# Patient Record
Sex: FEMALE | Race: WHITE | Employment: FULL TIME | URBAN - METROPOLITAN AREA
[De-identification: names, ages, dates, MRNs, and addresses within clinical notes are randomized per-mention and may not be internally consistent; named-entity substitution may affect disease eponyms.]

---

## 2021-05-10 ENCOUNTER — HOSPITAL ENCOUNTER (EMERGENCY)
Age: 46
Discharge: HOME | End: 2021-05-10
Attending: EMERGENCY MEDICINE
Payer: COMMERCIAL

## 2021-05-10 ENCOUNTER — APPOINTMENT (OUTPATIENT)
Dept: GENERAL RADIOLOGY | Age: 46
End: 2021-05-10
Payer: COMMERCIAL

## 2021-05-10 VITALS
WEIGHT: 121.9 LBS | HEIGHT: 63 IN | HEART RATE: 70 BPM | OXYGEN SATURATION: 99 % | RESPIRATION RATE: 14 BRPM | TEMPERATURE: 97.8 F | SYSTOLIC BLOOD PRESSURE: 150 MMHG | BODY MASS INDEX: 21.6 KG/M2 | DIASTOLIC BLOOD PRESSURE: 87 MMHG

## 2021-05-10 DIAGNOSIS — M25.562 CHRONIC PAIN OF LEFT KNEE: ICD-10-CM

## 2021-05-10 DIAGNOSIS — G56.01 CARPAL TUNNEL SYNDROME OF RIGHT WRIST: Primary | ICD-10-CM

## 2021-05-10 DIAGNOSIS — G89.29 CHRONIC PAIN OF LEFT KNEE: ICD-10-CM

## 2021-05-10 DIAGNOSIS — R03.0 ELEVATED BLOOD PRESSURE READING: ICD-10-CM

## 2021-05-10 PROCEDURE — 73562 X-RAY EXAM OF KNEE 3: CPT

## 2021-05-10 PROCEDURE — 99283 EMERGENCY DEPT VISIT LOW MDM: CPT

## 2021-05-10 RX ORDER — METHYLPREDNISOLONE 4 MG/1
TABLET ORAL
Qty: 1 KIT | Refills: 0 | Status: SHIPPED | OUTPATIENT
Start: 2021-05-10

## 2021-05-10 RX ORDER — MELOXICAM 15 MG/1
15 TABLET ORAL DAILY PRN
Qty: 15 TABLET | Refills: 1 | Status: SHIPPED | OUTPATIENT
Start: 2021-05-10 | End: 2021-05-25

## 2021-05-10 ASSESSMENT — PAIN SCALES - GENERAL
PAINLEVEL_OUTOF10: 2
PAINLEVEL_OUTOF10: 2

## 2021-05-10 ASSESSMENT — PAIN DESCRIPTION - ONSET: ONSET: PROGRESSIVE

## 2021-05-10 ASSESSMENT — PAIN DESCRIPTION - DURATION: DURATION_2: INTERMITTENT

## 2021-05-10 ASSESSMENT — PAIN DESCRIPTION - PROGRESSION: CLINICAL_PROGRESSION_2: GRADUALLY WORSENING

## 2021-05-10 ASSESSMENT — PAIN DESCRIPTION - ORIENTATION: ORIENTATION: LEFT;POSTERIOR

## 2021-05-10 ASSESSMENT — PAIN DESCRIPTION - PAIN TYPE: TYPE: CHRONIC PAIN

## 2021-05-10 ASSESSMENT — PAIN DESCRIPTION - INTENSITY: RATING_2: 5

## 2021-05-10 ASSESSMENT — PAIN DESCRIPTION - DESCRIPTORS: DESCRIPTORS_2: ACHING

## 2021-05-10 NOTE — ED PROVIDER NOTES
TRIAGE CHIEF COMPLAINT:   Chief Complaint   Patient presents with    Knee Pain     left    Other     Numbness to fingers on right hand         HPI: Steffany Peraza is a 48 y.o. female who presents to the Emergency Department with complaint of left posterior knee pain that has been on and off for a few months. She denies any injury. She states it is worse with certain movements but she does not have the pain now. Denies calf pain or swelling. No hip ankle or foot pain. No numbness or weakness. She also states she has had some numbness and tingling in the fingers of her right hand for 2 to 3 weeks especially the middle finger and index finger. She states that is worse at night and gets better after she wakes up in the morning. Last night she slept with a wrist splint on and states the pain was better. No known injury. No sensory loss or weakness. Denies elbow, shoulder or neck pain. No headache. REVIEW OF SYSTEMS:  6 systems reviewed. Pertinent positives per HPI. Otherwise noted to be negative. Nursing notes reviewed and agree with above. Past medical/surgical history reviewed. MEDICATIONS   Patient's Medications   New Prescriptions    No medications on file   Previous Medications    LORATADINE-PSEUDOEPHEDRINE (LORATADINE-D 24HR PO)    Take by mouth    VITAMIN D PO    Take by mouth   Modified Medications    No medications on file   Discontinued Medications    No medications on file         ALLERGIES No Known Allergies      BP (!) 156/90   Pulse 72   Temp 97.8 °F (36.6 °C) (Oral)   Resp 16   Ht 5' 3\" (1.6 m)   Wt 121 lb 14.4 oz (55.3 kg)   SpO2 99%   BMI 21.59 kg/m²   General:  No acute distress. Non toxic appearance  Head:   Normocephalic and atraumatic  Eyes:   Conjunctiva clear, MAGALY, EOM's intact. Sclera anicteric. ENT:   Mucous membranes moist  Neck:   Supple. No adenopathy or jugular venous distension.   No tenderness  Lungs/Chest:  No respiratory distress  CVS:   Regular rate and rhythm  Abdomen:  Nontender  Extremities:  Full range of motion. Distal neurovascular exam is intact. Radial pulses are 3+ and equal.  Normal capillary refill in all fingers. Patient has positive Phalen's test.  Left knee is not swollen or tender. Range of motion is 0 to 130 degrees. No laxity or effusion. No joint space tenderness. No calf tenderness. Distal neurovascular exams intact. Skin:   No rashes or lesions to exposed skin  Back:   Nontender  Neuro:  Alert and OX3. Speech clear and appropriate. No upper/lower extremity weakness. Normal sensation in all extremities. No facial asymmetry or weakness. Gait normal.  Psych:   Affect normal. Mood normal        RADIOLOGY:  XR KNEE LEFT (3 VIEWS)   Final Result      No acute bony pathology          LAB      ED COURSE / MDM:  78-year-old female with 2 to 3-week history of tingling in the fingers of the right hand after she sleeps, especially the middle and index finger, improved last night after she wore a wrist splint with positive Phalen's test suggesting carpal tunnel syndrome. Distal neurovascular exams intact. Both hands are warm with 3+ radial pulses. She also complains of some intermittent posterior left knee pain for several months. No swelling, effusion or laxity. Range of motion is normal.  X-rays of the left knee read by the radiologist and reviewed by myself shows nothing acute. I recommended the patient continue her wrist splints at night. She was given a prescription for Medrol Dosepak. Advised meloxicam if needed for pain. Recommended follow-up with primary care. She was given follow-up information for hand surgery. I discussed with Crystal Cline the results of the evaluation in the Emergency Department, diagnosis, care, prognosis and the importance of follow-up. The patient is stable for discharge. The patient and/or family are in agreement with the plan and all questions have been answered.   Specific discharge instructions were explained, including reasons to return to the emergency department.       (Please note that portions of this note may have been completed with a voice recognition program.  Efforts were made to edit the dictation but occasionally words are mis-transcribed)        FINAL IMPRESSION:  1 --right carpal tunnel syndrome  2 --left knee pain                Eli Grier MD  05/10/21 3633

## 2021-05-10 NOTE — ED TRIAGE NOTES
C/O pain to left posterior knee intermittent x 2 month that has gotten worse over the last week. Has a brace on knee that was OTC. Also /O numbness and pain to second through 4th finger x 3 days. Has a hx of carpal tunnel. Wearing a wrist brace that nurse loosened. States she recently had her first covid shot.

## 2021-05-11 ENCOUNTER — OFFICE VISIT (OUTPATIENT)
Dept: PRIMARY CARE CLINIC | Age: 46
End: 2021-05-11
Payer: COMMERCIAL

## 2021-05-11 VITALS
BODY MASS INDEX: 21.33 KG/M2 | OXYGEN SATURATION: 97 % | HEIGHT: 63 IN | DIASTOLIC BLOOD PRESSURE: 88 MMHG | WEIGHT: 120.4 LBS | SYSTOLIC BLOOD PRESSURE: 156 MMHG | HEART RATE: 59 BPM

## 2021-05-11 DIAGNOSIS — R20.0 HAND NUMBNESS: ICD-10-CM

## 2021-05-11 DIAGNOSIS — Z11.4 SCREENING FOR HIV (HUMAN IMMUNODEFICIENCY VIRUS): ICD-10-CM

## 2021-05-11 DIAGNOSIS — Z76.89 ENCOUNTER TO ESTABLISH CARE WITH NEW DOCTOR: Primary | ICD-10-CM

## 2021-05-11 DIAGNOSIS — Z11.59 ENCOUNTER FOR HEPATITIS C SCREENING TEST FOR LOW RISK PATIENT: ICD-10-CM

## 2021-05-11 DIAGNOSIS — Z12.4 SCREENING FOR CERVICAL CANCER: ICD-10-CM

## 2021-05-11 DIAGNOSIS — Z23 NEED FOR PROPHYLACTIC VACCINATION AGAINST STREPTOCOCCUS PNEUMONIAE (PNEUMOCOCCUS): ICD-10-CM

## 2021-05-11 DIAGNOSIS — M67.449 MUCOUS CYST OF FINGER: ICD-10-CM

## 2021-05-11 DIAGNOSIS — Z13.220 SCREENING FOR HYPERLIPIDEMIA: ICD-10-CM

## 2021-05-11 DIAGNOSIS — F17.210 CIGARETTE NICOTINE DEPENDENCE WITHOUT COMPLICATION: ICD-10-CM

## 2021-05-11 DIAGNOSIS — I10 ELEVATED BLOOD PRESSURE READING WITH DIAGNOSIS OF HYPERTENSION: ICD-10-CM

## 2021-05-11 DIAGNOSIS — Z76.89 ENCOUNTER TO ESTABLISH CARE WITH NEW DOCTOR: ICD-10-CM

## 2021-05-11 LAB
A/G RATIO: 1.8 (ref 1.1–2.2)
ALBUMIN SERPL-MCNC: 4.6 G/DL (ref 3.4–5)
ALP BLD-CCNC: 70 U/L (ref 40–129)
ALT SERPL-CCNC: 27 U/L (ref 10–40)
ANION GAP SERPL CALCULATED.3IONS-SCNC: 14 MMOL/L (ref 3–16)
AST SERPL-CCNC: 30 U/L (ref 15–37)
BILIRUB SERPL-MCNC: <0.2 MG/DL (ref 0–1)
BUN BLDV-MCNC: 13 MG/DL (ref 7–20)
CALCIUM SERPL-MCNC: 9.3 MG/DL (ref 8.3–10.6)
CHLORIDE BLD-SCNC: 103 MMOL/L (ref 99–110)
CHOLESTEROL, FASTING: 232 MG/DL (ref 0–199)
CO2: 23 MMOL/L (ref 21–32)
CREAT SERPL-MCNC: 0.7 MG/DL (ref 0.6–1.1)
GFR AFRICAN AMERICAN: >60
GFR NON-AFRICAN AMERICAN: >60
GLOBULIN: 2.6 G/DL
GLUCOSE BLD-MCNC: 82 MG/DL (ref 70–99)
HDLC SERPL-MCNC: 83 MG/DL (ref 40–60)
LDL CHOLESTEROL CALCULATED: 138 MG/DL
POTASSIUM SERPL-SCNC: 4.4 MMOL/L (ref 3.5–5.1)
SODIUM BLD-SCNC: 140 MMOL/L (ref 136–145)
TOTAL PROTEIN: 7.2 G/DL (ref 6.4–8.2)
TRIGLYCERIDE, FASTING: 53 MG/DL (ref 0–150)
VLDLC SERPL CALC-MCNC: 11 MG/DL

## 2021-05-11 PROCEDURE — G8420 CALC BMI NORM PARAMETERS: HCPCS | Performed by: STUDENT IN AN ORGANIZED HEALTH CARE EDUCATION/TRAINING PROGRAM

## 2021-05-11 PROCEDURE — 4004F PT TOBACCO SCREEN RCVD TLK: CPT | Performed by: STUDENT IN AN ORGANIZED HEALTH CARE EDUCATION/TRAINING PROGRAM

## 2021-05-11 PROCEDURE — 99396 PREV VISIT EST AGE 40-64: CPT | Performed by: STUDENT IN AN ORGANIZED HEALTH CARE EDUCATION/TRAINING PROGRAM

## 2021-05-11 PROCEDURE — 99214 OFFICE O/P EST MOD 30 MIN: CPT | Performed by: STUDENT IN AN ORGANIZED HEALTH CARE EDUCATION/TRAINING PROGRAM

## 2021-05-11 PROCEDURE — G8427 DOCREV CUR MEDS BY ELIG CLIN: HCPCS | Performed by: STUDENT IN AN ORGANIZED HEALTH CARE EDUCATION/TRAINING PROGRAM

## 2021-05-11 RX ORDER — BLOOD PRESSURE TEST KIT
1 KIT MISCELLANEOUS DAILY
Qty: 1 KIT | Refills: 0 | Status: SHIPPED | OUTPATIENT
Start: 2021-05-11

## 2021-05-11 SDOH — ECONOMIC STABILITY: TRANSPORTATION INSECURITY
IN THE PAST 12 MONTHS, HAS THE LACK OF TRANSPORTATION KEPT YOU FROM MEDICAL APPOINTMENTS OR FROM GETTING MEDICATIONS?: NOT ASKED

## 2021-05-11 SDOH — ECONOMIC STABILITY: INCOME INSECURITY: HOW HARD IS IT FOR YOU TO PAY FOR THE VERY BASICS LIKE FOOD, HOUSING, MEDICAL CARE, AND HEATING?: NOT ASKED

## 2021-05-11 SDOH — ECONOMIC STABILITY: TRANSPORTATION INSECURITY
IN THE PAST 12 MONTHS, HAS LACK OF TRANSPORTATION KEPT YOU FROM MEETINGS, WORK, OR FROM GETTING THINGS NEEDED FOR DAILY LIVING?: NOT ASKED

## 2021-05-11 SDOH — ECONOMIC STABILITY: FOOD INSECURITY: WITHIN THE PAST 12 MONTHS, THE FOOD YOU BOUGHT JUST DIDN'T LAST AND YOU DIDN'T HAVE MONEY TO GET MORE.: NOT ASKED

## 2021-05-11 ASSESSMENT — ENCOUNTER SYMPTOMS
STRIDOR: 0
SHORTNESS OF BREATH: 0
BLOOD IN STOOL: 0
BACK PAIN: 0
CONSTIPATION: 0
ABDOMINAL PAIN: 0
DIARRHEA: 0
CHEST TIGHTNESS: 0

## 2021-05-11 ASSESSMENT — PATIENT HEALTH QUESTIONNAIRE - PHQ9
SUM OF ALL RESPONSES TO PHQ9 QUESTIONS 1 & 2: 0
1. LITTLE INTEREST OR PLEASURE IN DOING THINGS: 0
SUM OF ALL RESPONSES TO PHQ QUESTIONS 1-9: 0

## 2021-05-11 NOTE — PROGRESS NOTES
Loratadine-Pseudoephedrine (LORATADINE-D 24HR PO) Take by mouth Yes Historical Provider, MD   meloxicam (MOBIC) 15 MG tablet Take 1 tablet by mouth daily as needed for Pain With food  Patient not taking: Reported on 5/11/2021  Emi Aguilera MD   methylPREDNISolone (MEDROL, JONAH,) 4 MG tablet Take with food as directed until gone  Patient not taking: Reported on 5/11/2021  Emi Aguilera MD        No Known Allergies    Past Medical History:   Diagnosis Date    Carpal tunnel syndrome     bilateral    Manic depressive disorder ([facility] [address]     Seasonal allergies        Past Surgical History:   Procedure Laterality Date    TONSILLECTOMY AND ADENOIDECTOMY      TUBAL LIGATION         Social History     Socioeconomic History    Marital status: Single     Spouse name: Ana-sister    Number of children: 3    Years of education: Not on file    Highest education level: Not on file   Occupational History    Occupation:    Social Needs    Financial resource strain: Not on file    Food insecurity     Worry: Not on file     Inability: Not on file   [employer] needs     Medical: Not on file     Non-medical: Not on file   Tobacco Use    Smoking status: Current Every Day Smoker     Packs/day: 1.00     Years: 30.00     Pack years: 30.00     Types: Cigarettes    Smokeless tobacco: Never Used   Substance and Sexual Activity    Alcohol use: Yes     Comment: Ocassionally    Drug use: Yes     Frequency: 10.0 times per week     Types: Marijuana    Sexual activity: Not on file   Lifestyle    Physical activity     Days per week: Not on file     Minutes per session: Not on file    Stress: Not on file   Relationships    Social connections     Talks on phone: Not on file     Gets together: Not on file     Attends Holiness service: Not on file     Active member of club or organization: Not on file     Attends meetings of clubs or organizations: Not on file     Relationship status: Not on file    Intimate partner violence     Fear of current or ex partner: Not on file     Emotionally abused: Not on file     Physically abused: Not on file     Forced sexual activity: Not on file   Other Topics Concern    Not on file   Social History Narrative    Not on file        Family History   Problem Relation Age of Onset    Hypertension Mother        Vitals:    05/11/21 1007 05/11/21 1039   BP: (!) 143/89 (!) 156/88   Pulse: 59    SpO2: 97%    Weight: 120 lb 6.4 oz (54.6 kg)    Height: 5' 3\" (1.6 m)      Estimated body mass index is 21.33 kg/m² as calculated from the following:    Height as of this encounter: 5' 3\" (1.6 m). Weight as of this encounter: 120 lb 6.4 oz (54.6 kg). Physical Exam  Vitals signs reviewed. Constitutional:       Appearance: Normal appearance. She is normal weight. HENT:      Head: Normocephalic and atraumatic. Right Ear: Tympanic membrane, ear canal and external ear normal.      Left Ear: Tympanic membrane, ear canal and external ear normal.      Nose: Nose normal.      Mouth/Throat:      Mouth: Mucous membranes are moist.      Pharynx: Oropharynx is clear. Eyes:      Extraocular Movements: Extraocular movements intact. Conjunctiva/sclera: Conjunctivae normal.   Neck:      Musculoskeletal: Normal range of motion and neck supple. Cardiovascular:      Rate and Rhythm: Normal rate and regular rhythm. Pulses: Normal pulses. Heart sounds: Normal heart sounds. Pulmonary:      Effort: Pulmonary effort is normal.      Breath sounds: Normal breath sounds. Abdominal:      General: Abdomen is flat. Bowel sounds are normal.      Palpations: Abdomen is soft. Musculoskeletal: Normal range of motion. General: Deformity (Small cyst base of right index finger; does not move with tendon) present. Skin:     General: Skin is warm and dry. Capillary Refill: Capillary refill takes less than 2 seconds. Findings: No rash.    Neurological:      General: No focal deficit present. Mental Status: She is alert and oriented to person, place, and time. Mental status is at baseline. Sensory: No sensory deficit. Comments: Positive prayer/Phalen's bilaterally   Psychiatric:         Mood and Affect: Mood normal.         Behavior: Behavior normal.         Thought Content: Thought content normal.         Judgment: Judgment normal.       Separate Identifiable issues addressed today:  Hand numbness  Patient presents for evaluation of hand paresthesias. Onset of the symptoms was several months ago. Current symptoms include: tingling/numbness involving the lateral hands; R > L. Aggravating factors: worse at night or first thing in the morning and worse with activity. Symptoms have gradually worsened. Evaluation to date: bracing with only mild improvement . Treatment to date: OTC analgesics, which has been ineffective. ASSESSMENT/PLAN:  Ramy Ayers was seen today for established new doctor, knee pain and carpal tunnel. Diagnoses and all orders for this visit:    Encounter to establish care with new doctor: Vitals reviewed and blood pressure is elevated. See below. BMI is nonobese. Reviewed diet exercise regimen patient recommend appropriate lifestyle modifications. -     Comprehensive Metabolic Panel; Future    Elevated blood pressure reading with diagnosis of hypertension: Blood pressure is elevated. No prior history of hypertension. Will send home with blood pressure kit and have her check biweekly for the next month and then follow-up. -     Blood Pressure KIT; 1 each by Does not apply route daily    Cigarette nicotine dependence without complication: Working on smoking cessation. Offered Chantix today, but she would like to continue doing on her own. Mucous cyst of finger: She is unsure how long it has been present, but became irritated in the last few days.   Will refer to Dr. Samuel Torres for evaluation.  -     Ambulatory referral to Hand Surgery    Hand numbness: HPI and exam consistent with carpal tunnel. Will have her get outpatient EMG and follow-up with Dr. Yolande Dominguez for evaluation.  Rey Martínez MD (Inpatient and Outpatient EMG), [facility]    Screening for hyperlipidemia  -     Lipid, Fasting; Future    Encounter for hepatitis C screening test for low risk patient  -     Hepatitis C Antibody; Future    Screening for HIV (human immunodeficiency virus)  -     HIV Screen; Future    Screening for cervical cancer  -     Ambulatory referral to Gynecology    Need for prophylactic vaccination against Streptococcus pneumoniae (pneumococcus): Not interested today, but will bring up at next appointment. Think she could be convinced. Return in about 6 weeks (around 6/22/2021) for Annual Wellness. An  electronic signature was used to authenticate this note.     --Edmundo Door, DO on 5/11/2021 at 11:06 AM

## 2021-05-12 LAB
HEPATITIS C ANTIBODY INTERPRETATION: NORMAL
HIV AG/AB: NORMAL
HIV ANTIGEN: NORMAL
HIV-1 ANTIBODY: NORMAL
HIV-2 AB: NORMAL

## 2023-12-04 ENCOUNTER — HOSPITAL ENCOUNTER (EMERGENCY)
Age: 48
Discharge: HOME | End: 2023-12-04
Attending: EMERGENCY MEDICINE
Payer: COMMERCIAL

## 2023-12-04 ENCOUNTER — APPOINTMENT (OUTPATIENT)
Dept: GENERAL RADIOLOGY | Age: 48
End: 2023-12-04
Payer: COMMERCIAL

## 2023-12-04 VITALS
TEMPERATURE: 98.2 F | DIASTOLIC BLOOD PRESSURE: 84 MMHG | BODY MASS INDEX: 23.29 KG/M2 | HEART RATE: 73 BPM | SYSTOLIC BLOOD PRESSURE: 139 MMHG | WEIGHT: 118.61 LBS | HEIGHT: 60 IN | OXYGEN SATURATION: 99 % | RESPIRATION RATE: 15 BRPM

## 2023-12-04 DIAGNOSIS — S61.012A LACERATION OF LEFT THUMB WITH COMPLICATION, INITIAL ENCOUNTER: Primary | ICD-10-CM

## 2023-12-04 PROCEDURE — 99283 EMERGENCY DEPT VISIT LOW MDM: CPT

## 2023-12-04 PROCEDURE — 6370000000 HC RX 637 (ALT 250 FOR IP): Performed by: EMERGENCY MEDICINE

## 2023-12-04 PROCEDURE — 73140 X-RAY EXAM OF FINGER(S): CPT

## 2023-12-04 RX ORDER — CEPHALEXIN 500 MG/1
500 CAPSULE ORAL ONCE
Status: COMPLETED | OUTPATIENT
Start: 2023-12-04 | End: 2023-12-04

## 2023-12-04 RX ORDER — CEPHALEXIN 500 MG/1
500 CAPSULE ORAL 4 TIMES DAILY
Qty: 20 CAPSULE | Refills: 0 | Status: SHIPPED | OUTPATIENT
Start: 2023-12-04 | End: 2023-12-09

## 2023-12-04 RX ADMIN — CEPHALEXIN 500 MG: 500 CAPSULE ORAL at 14:15

## 2023-12-04 ASSESSMENT — PAIN DESCRIPTION - LOCATION: LOCATION: FINGER (COMMENT WHICH ONE)

## 2023-12-04 ASSESSMENT — LIFESTYLE VARIABLES
HOW MANY STANDARD DRINKS CONTAINING ALCOHOL DO YOU HAVE ON A TYPICAL DAY: PATIENT DOES NOT DRINK
HOW OFTEN DO YOU HAVE A DRINK CONTAINING ALCOHOL: NEVER

## 2023-12-04 ASSESSMENT — PAIN DESCRIPTION - FREQUENCY: FREQUENCY: CONTINUOUS

## 2023-12-04 ASSESSMENT — PAIN - FUNCTIONAL ASSESSMENT
PAIN_FUNCTIONAL_ASSESSMENT: 0-10
PAIN_FUNCTIONAL_ASSESSMENT: ACTIVITIES ARE NOT PREVENTED

## 2023-12-04 ASSESSMENT — PAIN DESCRIPTION - ORIENTATION: ORIENTATION: LEFT

## 2023-12-04 ASSESSMENT — PAIN SCALES - GENERAL: PAINLEVEL_OUTOF10: 8

## 2023-12-04 ASSESSMENT — PAIN DESCRIPTION - DESCRIPTORS: DESCRIPTORS: THROBBING

## 2023-12-04 ASSESSMENT — PAIN DESCRIPTION - ONSET: ONSET: GRADUAL

## 2023-12-04 ASSESSMENT — PAIN DESCRIPTION - PAIN TYPE: TYPE: ACUTE PAIN

## 2023-12-04 NOTE — ED NOTES
Pt states that she cut her left thumb last night about 1030 pm at work on a wine stem glass, she reports she felt instant pain and throbbing she reports she still has pain in her thumb she feels like something is sharp in there . wound cleaned with Hibiclens and saline, gauze applied bleeding controlled      Viviano Gitelman, RN  12/04/23 3557

## 2023-12-04 NOTE — DISCHARGE INSTRUCTIONS
Follow-up with the occupational health clinic in 1 to 2 days for reexamination and wound check. Recommend Polysporin antibiotic ointment twice daily for 3 to 5 days. Wear finger splint for 3 to 5 days to protect the wound. Keep wound clean and dry for at least 24 hours. After 24 hours you may shower and clean the area with warm soapy water. Avoid submerging the wound underwater in a bathtub, swimming pool, or hot tub until sutures are removed. If condition worsens or new symptoms develop, return immediately to the emergency department.

## 2023-12-04 NOTE — ED PROVIDER NOTES
[address] [facility],[address] ENCOUNTER      Pt Name: Leif Ortiz  MRN: [id number]  [address] [facility] 1975  Date of evaluation: 12/4/2023  Provider: Telly Casanova       Chief Complaint   Patient presents with    Laceration     Left finger laceration cut on a stem of a wine glass last night about 7pm          HISTORY OF PRESENT ILLNESS   (Location/Symptom, Timing/Onset, Context/Setting, Quality, Duration, Modifying Factors, Severity)  Note limiting factors. Leif Ortiz is a 46 y.o. female who presents to the emergency department with a complaint of a laceration on the volar aspect of her left thumb just distal to the crease of the DIP. She states that she was cleaning a wine glass yesterday at approximately 7 PM and the stem broke and cut her left thumb. She denies any other injury. She denies any weakness or numbness. She denies any foreign body. She does not take any anticoagulants. She did not seek care last night when the injury occurred. However, she had some intermittent bleeding today prompting her to come to the emergency department    Nursing Notes were reviewed. HPI        REVIEW OF SYSTEMS    (2-9 systems for level 4, 10 or more for level 5)       Constitutional: Negative for fever or chills. Respiratory: Negative for shortness of breath or dyspnea on exertion. Cardiovascular: Negative for chest pain. Gastrointestinal: Negative for abdominal pain. Negative for vomiting or diarrhea. Neurological: Negative for headache. All systems are reviewed and are negative except for those listed above in the history of present illness and ROS.         PAST MEDICAL HISTORY     Past Medical History:   Diagnosis Date    Carpal tunnel syndrome     bilateral    Manic depressive disorder ([address])     Seasonal allergies          SURGICAL HISTORY       Past Surgical History:   Procedure Laterality Date    TONSILLECTOMY AND